# Patient Record
Sex: FEMALE | Race: OTHER | URBAN - METROPOLITAN AREA
[De-identification: names, ages, dates, MRNs, and addresses within clinical notes are randomized per-mention and may not be internally consistent; named-entity substitution may affect disease eponyms.]

---

## 2024-10-31 ENCOUNTER — LAB ENCOUNTER (OUTPATIENT)
Dept: LAB | Facility: HOSPITAL | Age: 29
End: 2024-10-31
Attending: NURSE PRACTITIONER
Payer: COMMERCIAL

## 2024-10-31 ENCOUNTER — TELEPHONE (OUTPATIENT)
Facility: CLINIC | Age: 29
End: 2024-10-31

## 2024-10-31 ENCOUNTER — OFFICE VISIT (OUTPATIENT)
Facility: CLINIC | Age: 29
End: 2024-10-31
Payer: COMMERCIAL

## 2024-10-31 VITALS
BODY MASS INDEX: 25.86 KG/M2 | HEIGHT: 61 IN | HEART RATE: 76 BPM | WEIGHT: 137 LBS | SYSTOLIC BLOOD PRESSURE: 130 MMHG | DIASTOLIC BLOOD PRESSURE: 85 MMHG

## 2024-10-31 DIAGNOSIS — K92.1 BLOOD IN STOOL: ICD-10-CM

## 2024-10-31 DIAGNOSIS — Z87.19 HISTORY OF COLITIS: ICD-10-CM

## 2024-10-31 DIAGNOSIS — R19.5 MUCUS IN STOOL: Primary | ICD-10-CM

## 2024-10-31 DIAGNOSIS — R19.5 MUCUS IN STOOL: ICD-10-CM

## 2024-10-31 DIAGNOSIS — Z87.19 HISTORY OF COLITIS: Primary | ICD-10-CM

## 2024-10-31 LAB
BASOPHILS # BLD AUTO: 0.08 X10(3) UL (ref 0–0.2)
BASOPHILS NFR BLD AUTO: 0.7 %
CRP SERPL-MCNC: <0.4 MG/DL (ref ?–1)
DEPRECATED RDW RBC AUTO: 43.3 FL (ref 35.1–46.3)
EOSINOPHIL # BLD AUTO: 0.22 X10(3) UL (ref 0–0.7)
EOSINOPHIL NFR BLD AUTO: 1.8 %
ERYTHROCYTE [DISTWIDTH] IN BLOOD BY AUTOMATED COUNT: 13.5 % (ref 11–15)
HCT VFR BLD AUTO: 40.2 %
HGB BLD-MCNC: 13.3 G/DL
IMM GRANULOCYTES # BLD AUTO: 0.09 X10(3) UL (ref 0–1)
IMM GRANULOCYTES NFR BLD: 0.8 %
LYMPHOCYTES # BLD AUTO: 2.93 X10(3) UL (ref 1–4)
LYMPHOCYTES NFR BLD AUTO: 24.4 %
MCH RBC QN AUTO: 29 PG (ref 26–34)
MCHC RBC AUTO-ENTMCNC: 33.1 G/DL (ref 31–37)
MCV RBC AUTO: 87.6 FL
MONOCYTES # BLD AUTO: 0.82 X10(3) UL (ref 0.1–1)
MONOCYTES NFR BLD AUTO: 6.8 %
NEUTROPHILS # BLD AUTO: 7.85 X10 (3) UL (ref 1.5–7.7)
NEUTROPHILS # BLD AUTO: 7.85 X10(3) UL (ref 1.5–7.7)
NEUTROPHILS NFR BLD AUTO: 65.5 %
PLATELET # BLD AUTO: 308 10(3)UL (ref 150–450)
RBC # BLD AUTO: 4.59 X10(6)UL
WBC # BLD AUTO: 12 X10(3) UL (ref 4–11)

## 2024-10-31 PROCEDURE — 36415 COLL VENOUS BLD VENIPUNCTURE: CPT

## 2024-10-31 PROCEDURE — 86140 C-REACTIVE PROTEIN: CPT

## 2024-10-31 PROCEDURE — 85025 COMPLETE CBC W/AUTO DIFF WBC: CPT

## 2024-10-31 RX ORDER — DIFLUPREDNATE OPHTHALMIC 0.5 MG/ML
EMULSION OPHTHALMIC
COMMUNITY
Start: 2024-10-25

## 2024-10-31 NOTE — PATIENT INSTRUCTIONS
-Schedule follow up with MD after colonoscopy   -lab today for inflammation and stool samples     1. Schedule colonoscopy with Urgent Pool Endoscopist  Diagnosis: history of ulcerative colitis, blood in stool   Sedation: MAC  Prep: split dose golytely    2.  bowel prep from pharmacy   You can pick the bowel prep up now and store in a cool, dry place in your home until your scheduled bowel prep start date.    3. Continue all medications as normal for your procedure. DO NOT TAKE: Any form of alcohol, recreational drugs and any forms of erectile dysfunction medications 24 hours prior to procedure.    4. Read all bowel prep instructions carefully. Bowel prep instructions can also be found online at:  www.[facility].org/giprep     5. AVOID seeds, nuts, popcorn, raw fruits and vegetables for 5 days before procedure    6. If you start any NEW medication after your visit today, please notify us. Certain medications (like iron or weight loss medications) will need to be held before the procedure, or the procedure cannot be performed safely.

## 2024-10-31 NOTE — TELEPHONE ENCOUNTER
Joshua Medrano patient has chosen to leave the hospital against medical advice. The attending physician has not discharged the patient. Patient is not a risk to himself or others. I have discussed with the patient the following:  Physician has not determined patient is ready for discharge, Risks and consequences of leaving the hospital too soon and Benefit of continued hospitalization.      Discharge against medical advice form has been Signed.      Attending physician has been notified.         Pt leaving AMA after discussion with ERP. Discharge instructions given to pt. Prescriptions handed to pt at bedside. Pt educated, verbalizes understanding. All belongings accounted for. Pt wheeled out of ED with family at side to go home. PIV removed and dressing applied.       Scheduled for:  Colonoscopy 27144   [facility]     Provider: Bernice Hurtado MD    Date:  12/13/2024 Friday  Time:   1:40 PM  (Patient made aware will receive phone call the day before with an arrival time)    Sedation:  MAC  Prep:  Split GoLytely    Diagnosis with codes:    ICD-10-CM   1. History of colitis  Z87.19   2. Blood in stool  K92.1        Meds/Allergies Reconciled?:   Provider Reviewed   Was patient informed to call insurance with codes (Y/N):  Yes  Referral sent?: Referral was sent at the time of electronic surgical scheduling.  [facility] or [facility] notified?: I sent an electronic request to ENDO Scheduling and received a confirmation today.     Medication Orders:  Patient is aware to NOT take iron pills, herbal meds and diet supplements for 7 days before exam. Also to NOT take any form of alcohol, recreational drugs and any forms of ED meds 24 hours before exam.       Misc Orders:  N/A   Further instructions given by staff:  I Provided prep instructions to patient and reviewed date, time and location. Patient verbalized that  She / Her understood and is aware to call with any questions.  Patient was informed about the new cancellation policy for She / Her procedure. Patient was also given copy of the cancellation policy at the time of the appointment and verbalized understanding.

## 2024-10-31 NOTE — H&P
[facility] - Gastroenterology                                                                                                  Clinic History and Physical     No chief complaint on file.      Requesting physician or provider: Unknown Pcp    HPI:   Cecily Rae is a 29 year old year-old female with history of ulcerative colitis. The patient presents for colon cancer screening evaluation.    Was diagnosed with ulcerative colitis in 2019 via flexsig per patient, only has paper given after procedure. Was sent Sulfasalazine rx but did ever start medication. Was unable to to follow up with GI and now the doctor has left the practice at [facility]. Is having a hard time getting records. Symptoms had been managed with diet until now.     Since July 2024 has had blood and mucus in stool. Having more constipation than diarrhea. Having a lot of cramping abdominal pain. Has rash on legs and feet on and off. Currently having inflammation in her eye being treated with steroids.   Has some sort of testing at PCP- [facility]- thinks was related to gluten.     Last colonoscopy: 2019 at [facility], patient unable to get records  Last EGD:     NSAIDS: none  Tobacco: none  Alcohol: socically  Marijuana: none  Illicit drugs: none    FH GI malignancy:  none  Aunt may have IBD    No history of adverse reaction to sedation  No MARILUZ  No anticoagulants/antiplatelet  No pacemaker/defibrillator  No pain medications and/or sleep aides    Wt Readings from Last 6 Encounters:   01/21/22 130 lb (59 kg)          History, Medications, Allergies, ROS:      Past Medical History:    Headache disorder      Past Surgical History:   Procedure Laterality Date    Colonoscopy  2019      Family Hx: No family history on file.   Social History:   Social History     Socioeconomic History    Marital status: OTHER   Tobacco Use    Smoking status: Never    Smokeless tobacco: Never   Vaping Use    Vaping status: Never Used   Substance and Sexual  Activity    Alcohol use: Yes    Drug use: Never    Sexual activity: Yes     Partners: Male        Medications (Active prior to today's visit):  No current outpatient medications on file.       Allergies:  Allergies[1]    ROS:   CONSTITUTIONAL: negative for fevers, chills, sweats  EYES Negative for scleral icterus or redness, and diplopia  HEENT: Negative for hoarseness  RESPIRATORY: Negative for cough and severe shortness of breath  CARDIOVASCULAR: Negative for crushing sub-sternal chest pain  GASTROINTESTINAL: See HPI  GENITOURINARY: Negative for dysuria  MUSCULOSKELETAL: Negative for arthralgias and myalgias  SKIN: Negative for jaundice, rash or pruritus  NEUROLOGICAL: Negative for dizziness and headaches  BEHAVIOR/PSYCH: Negative for psychotic behavior      PHYSICAL EXAM:   There were no vitals taken for this visit.    GEN: Alert, no acute distress, well-nourished   HEENT: anicteric sclera, neck supple, trachea midline, MMM, no palpable or tender neck or supraclavicular lymph nodes  CV: RRR, the extremities are warm and well perfused   LUNGS: No increased work of breathing, CTAB  ABDOMEN: Soft, symmetrical, non-tender without distention or guarding. No scars or lesions. Aorta is without bruit or visible pulsation. Umbilicus is midline without herniation. Normoactive bowel sounds are present, No masses, hepatomegaly or splenomegaly noted.  MSK: No erythema, no warmth, no swelling of joints  SKIN: No jaundice, no erythema, no rashes, no lesions  HEMATOLOGIC: No bleeding, no bruising  NEURO: Alert and interactive, ESTRADA  PSYCH: appropriate mood & affect    Labs/Imaging:     Patient's labs and imaging were reviewed and discussed with patient today.    .  ASSESSMENT/PLAN:   29 year old female presents for blood and mucous in stool x6 months.    #blood in stool  #mucus in stool  #history of colitis  Patient does not have pathology records. Advised to obtain stool testing, CRP, and CBC today. Schedule colonoscopy to  obtain accurate diagnosis. Follow up with MD after to discuss treatment options.       Endoscopy risk/benefit discussion: I have thoroughly discussed the risks, benefits, and alternatives of endoscopic evaluation with the patient, who demonstrated understanding. This includes the potential risks of bleeding, infection, pain, anesthesia complications, and perforation, which may result in prolonged hospitalization or surgical intervention. All of the patient’s questions were addressed to their satisfaction. The patient has chosen to proceed with the endoscopic procedure, including any necessary interventions such as polypectomy, biopsy, control of bleeding.      Orders This Visit:  No orders of the defined types were placed in this encounter.      Meds This Visit:  Requested Prescriptions      No prescriptions requested or ordered in this encounter       Imaging & Referrals:  None       SCHUYLER Rodriguez    [facility] Gastroenterology  10/31/2024               [1] No Known Allergies

## 2024-12-06 ENCOUNTER — TELEPHONE (OUTPATIENT)
Facility: CLINIC | Age: 29
End: 2024-12-06

## 2024-12-06 NOTE — TELEPHONE ENCOUNTER
1st Reminder letter was sent to patient mychart for orders pending:     C. diff toxigenic PCR (OPT) (Order #[id number]) on 10/31/24     Calprotectin, Fecal (Order #[id number]) on 10/31/24     Stool Culture W/Shigatoxin (Order #[id number]) on 10/31/24

## 2024-12-13 ENCOUNTER — HOSPITAL ENCOUNTER (OUTPATIENT)
Age: 29
Setting detail: HOSPITAL OUTPATIENT SURGERY
Discharge: HOME | End: 2024-12-13
Attending: INTERNAL MEDICINE | Admitting: INTERNAL MEDICINE
Payer: COMMERCIAL

## 2024-12-13 ENCOUNTER — ANESTHESIA (OUTPATIENT)
Dept: ENDOSCOPY | Age: 29
End: 2024-12-13
Payer: COMMERCIAL

## 2024-12-13 ENCOUNTER — ANESTHESIA EVENT (OUTPATIENT)
Dept: ENDOSCOPY | Age: 29
End: 2024-12-13
Payer: COMMERCIAL

## 2024-12-13 VITALS
HEIGHT: 61 IN | WEIGHT: 130 LBS | SYSTOLIC BLOOD PRESSURE: 104 MMHG | OXYGEN SATURATION: 99 % | BODY MASS INDEX: 24.55 KG/M2 | DIASTOLIC BLOOD PRESSURE: 69 MMHG | RESPIRATION RATE: 20 BRPM | HEART RATE: 70 BPM

## 2024-12-13 DIAGNOSIS — Z87.19 HISTORY OF COLITIS: ICD-10-CM

## 2024-12-13 DIAGNOSIS — K92.1 BLOOD IN STOOL: ICD-10-CM

## 2024-12-13 LAB — B-HCG UR QL: NEGATIVE

## 2024-12-13 PROCEDURE — 99070 SPECIAL SUPPLIES PHYS/QHP: CPT | Performed by: INTERNAL MEDICINE

## 2024-12-13 PROCEDURE — 45380 COLONOSCOPY AND BIOPSY: CPT | Performed by: INTERNAL MEDICINE

## 2024-12-13 RX ORDER — LIDOCAINE HYDROCHLORIDE 10 MG/ML
INJECTION, SOLUTION EPIDURAL; INFILTRATION; INTRACAUDAL; PERINEURAL AS NEEDED
Status: DISCONTINUED | OUTPATIENT
Start: 2024-12-13 | End: 2024-12-13 | Stop reason: SURG

## 2024-12-13 RX ORDER — MESALAMINE 4 G/60ML
4 SUSPENSION RECTAL NIGHTLY
Qty: 90 ENEMA | Refills: 1 | Status: SHIPPED | OUTPATIENT
Start: 2024-12-13 | End: 2025-12-08

## 2024-12-13 RX ORDER — SODIUM CHLORIDE, SODIUM LACTATE, POTASSIUM CHLORIDE, CALCIUM CHLORIDE 600; 310; 30; 20 MG/100ML; MG/100ML; MG/100ML; MG/100ML
INJECTION, SOLUTION INTRAVENOUS CONTINUOUS
OUTPATIENT
Start: 2024-12-13

## 2024-12-13 RX ORDER — SODIUM CHLORIDE, SODIUM LACTATE, POTASSIUM CHLORIDE, CALCIUM CHLORIDE 600; 310; 30; 20 MG/100ML; MG/100ML; MG/100ML; MG/100ML
INJECTION, SOLUTION INTRAVENOUS CONTINUOUS
Status: DISCONTINUED | OUTPATIENT
Start: 2024-12-13 | End: 2024-12-13

## 2024-12-13 RX ORDER — NALOXONE HYDROCHLORIDE 0.4 MG/ML
0.08 INJECTION, SOLUTION INTRAMUSCULAR; INTRAVENOUS; SUBCUTANEOUS ONCE AS NEEDED
OUTPATIENT
Start: 2024-12-13 | End: 2024-12-13

## 2024-12-13 RX ADMIN — LIDOCAINE HYDROCHLORIDE 50 MG: 10 INJECTION, SOLUTION EPIDURAL; INFILTRATION; INTRACAUDAL; PERINEURAL at 13:32:00

## 2024-12-13 RX ADMIN — SODIUM CHLORIDE, SODIUM LACTATE, POTASSIUM CHLORIDE, CALCIUM CHLORIDE: 600; 310; 30; 20 INJECTION, SOLUTION INTRAVENOUS at 13:45:00

## 2024-12-13 RX ADMIN — SODIUM CHLORIDE, SODIUM LACTATE, POTASSIUM CHLORIDE, CALCIUM CHLORIDE: 600; 310; 30; 20 INJECTION, SOLUTION INTRAVENOUS at 13:30:00

## 2024-12-13 NOTE — H&P
Pre Procedure History & Physical Examination    Patient Name: Cecily Rae  MRN: [id number]  CSN: [id number]  YOB: 1995    Diagnosis: rectal bleeding, tenesmus, history of colitis    Prescriptions Prior to Admission[1]  Current Facility-Administered Medications   Medication Dose Route Frequency    lactated ringers infusion   Intravenous Continuous       Allergies: Allergies[2]    Past Medical History:    Headache disorder     Past Surgical History:   Procedure Laterality Date    Colonoscopy  2019    Colonoscopy  12/13/2024     Family History   Problem Relation Age of Onset    Colon Cancer Neg      Social History     Tobacco Use    Smoking status: Never    Smokeless tobacco: Never   Substance Use Topics    Alcohol use: Yes         ROS:   CONSTITUTIONAL:  negative for fevers, rigors  EYES:  negative for diplopia   RESPIRATORY:  negative for severe shortness of breath  CARDIOVASCULAR:  negative for crushing sub-sternal chest pain  GASTROINTESTINAL:  see HPI  GENITOURINARY:  negative for dysuria or gross hematuria  INTEGUMENT/BREAST:  SKIN:  negative for jaundice   ALLERGIC/IMMUNOLOGIC:  negative for hay fever  ENDOCRINE:  negative for cold intolerance and heat intolerance  MUSCULOSKELETAL:  negative for joint effusion/severe erythema  BEHAVIOR/PSYCH:  negative for psychotic behavior      PHYSICAL EXAM:   /70   Pulse 70   Resp 15   Ht 5' 1\" (1.549 m)   Wt 130 lb (59 kg)   SpO2 99%   BMI 24.56 kg/m²       Gen: Patient appears comfortable and in no acute discomfort  HEENT: the sclera appears anicteric, oropharynx clear, mucus membranes appear moist  CV: regular rate and rhythm, the extremities are warm and well perfused   Lung: Moves air well; No labored breathing  Abdomen: soft, non-tender exam in all quadrants without rigidity or guarding, non-distended, no abnormal bowel sounds noted, no masses are palpated  Skin: No jaundice  Ext: no cyanosis, clubbing or edema is evident.   Neuro: Alert and  interactive, and gross movements of extremities normal    I have discussed the risks and benefits and alternatives of the procedure with the patient/family.  They understand and agree to proceed with plan of care.   I have reviewed recent H&P/clinic notes  Bernice Hurtado MD  [facility] - Gastroenterology  12/13/2024  1:25 PM         [1]   Medications Prior to Admission   Medication Sig Dispense Refill Last Dose/Taking    difluprednate 0.05 % Ophthalmic Emulsion INSTILL 1 DROP INTO RIGHT EYE FOUR TIMES DAILY FOR 5 DAYS   12/12/2024    polyethylene glycol, PEG 3350-KCl-NaBcb-NaCl-NaSulf, 236 g Oral Recon Soln Take 4,000 mL by mouth As Directed. Take 2,000 mL the night before your procedure and 2,000 mL the morning of your procedure. 1 each 0 12/12/2024   [2] No Known Allergies

## 2024-12-13 NOTE — ANESTHESIA POSTPROCEDURE EVALUATION
Patient: Cecily Rae    Procedure Summary       Date: 12/13/24 [facility] ENDO    Anesthesia Start: 1330 Anesthesia Stop: 1356    Procedure: COLONOSCOPY Diagnosis:       History of colitis      Blood in stool      (rectal sigmoid colitis, hemorrhoids)    Surgeons: Bernice Hurtado MD Anesthesiologist: Nirav Kwong DO    Anesthesia Type: MAC ASA Status: 1            Anesthesia Type: MAC    Vitals Value Taken Time   /72 12/13/24 1358   Temp  12/13/24 1400   Pulse 85 12/13/24 1359   Resp 13 12/13/24 1359   SpO2 97 % 12/13/24 1359   Vitals shown include unfiled device data.    [facility] AN Post Evaluation:   Patient Evaluated in PACU  Patient Participation: complete - patient participated  Level of Consciousness: awake  Pain Management: adequate  Airway Patency:patent  Dental exam unchanged from preop  Yes    Nausea/Vomiting: none  Cardiovascular Status: acceptable  Respiratory Status: acceptable and room air  Postoperative Hydration acceptable      NIRAV KWONG DO  12/13/2024 2:00 PM

## 2024-12-13 NOTE — DISCHARGE INSTRUCTIONS
Home Care Instructions for Colonoscopy with Sedation    Diet:  - Resume your regular diet as tolerated unless otherwise instructed.  - Start with light meals to minimize bloating.  - Do not drink alcohol today.    Medication:  - If you have questions about resuming your normal medications, please contact your Primary Care Physician.    Activities:  - Take it easy today. Do not return to work today.  - Do not drive today.  - Do not operate any machinery today (including kitchen equipment).    Colonoscopy:  - You may notice some rectal \"spotting\" (a little blood on the toilet tissue) for a day or two after the exam. This is normal.  - If you experience any rectal bleeding (not spotting), persistent tenderness or sharp severe abdominal pains, oral temperature over 100 degrees Fahrenheit, light-headedness or dizziness, or any other problems, contact your doctor.    **If unable to reach your doctor, please go to the [facility] Emergency Room**    - Your referring physician will receive a full report of your examination.  - If you do not hear from your doctor's office within two weeks of your biopsy, please call them for your results.    You may be able to see your laboratory results in [employer] between 4 and 7 business days.  In some cases, your physician may not have viewed the results before they are released to [employer].  If you have questions regarding your results contact the physician who ordered the test/exam by phone or via [employer] by choosing \"Ask a Medical Question.\"

## 2024-12-13 NOTE — PRE-SEDATION ASSESSMENT
Physician Pre-Sedation Assessment    Pre-Sedation Assessment:    Sedation History: Previous Sedation with No Complications and Airway Assessed    Cardiac: normal S1, S2  Respiratory: breath sounds clear bilaterally   Abdomen: soft, BS (+), non-tender    ASA Classification: 1. Normal healthy patient    Plan: MAC sedation

## 2024-12-13 NOTE — OPERATIVE REPORT
COLONOSCOPY REPORT    Cecily Rae     1995 Age 29 year old   PCP Unknown Pcp Endoscopist Bernice Hurtado MD     Date of procedure: 24    Procedure: Colonoscopy w/biopsies    Pre-operative diagnosis: rectal bleeding and tenesmus    Post-operative diagnosis: colitis from 15 cm to rectum    Medications: MAC sedation    Withdrawal time: 9 minutes    Procedure:  Informed consent was obtained from the patient after the risks of the procedure were discussed, including but not limited to bleeding, perforation, aspiration, infection, or possibility of a missed lesion. After discussions of the risks/benefits and alternatives to this procedure, as well as the planned sedation, the patient was placed in the left lateral decubitus position and begun on continuous blood pressure pulse oximetry and EKG monitoring and this was maintained throughout the procedure. Once an adequate level of sedation was obtained a digital rectal exam was completed. Then the lubricated tip of the [id number] diagnostic video colonoscope was inserted and advanced without difficulty to the cecum using the CO2 insufflation technique. The cecum was identified by localizing the trifold, the appendix and the ileocecal valve. Withdrawal was begun with thorough washing and careful examination of the colonic walls and folds. A routine second examination of the cecum/ascending colon was performed. Photodocumentation was obtained. The bowel prep was good. Views of the colon were excellent with washing. I then carefully withdrew the instrument from the patient who tolerated the procedure well.     Complications: none.    Findings:   1. No polyp(s) noted     2. Diverticulosis: none noted.    3. Terminal ileum: the visualized mucosa appeared normal.    4. Given colitis did not retroflex.    5. Erythema and oozing, biopsied for colitis from 15 cm to rectum. Otherwise the colonic mucosa throughout the colon showed normal vascular pattern, without  evidence of angioectasias or inflammation.     6. JOIE: normal rectal tone, no masses palpated.     Recommend:  Await pathology  Rowasa enema ordered  High fiber diet.  Monitor for blood in the stool. If having more than just tinge of blood, call office or go to the ER.    >>>If tissue was obtained and you have not received your pathology results either by phone or letter within 2 weeks, please call our office at [phone].    Specimens: rectosigmoid biospies

## 2024-12-13 NOTE — ANESTHESIA PREPROCEDURE EVALUATION
Anesthesia PreOp Note    HPI:     Cecily Rae is a 29 year old female who presents for preoperative consultation requested by: Bernice Hurtado MD    Date of Surgery: 12/13/2024    Procedure(s):  COLONOSCOPY  Indication: History of colitis / Blood in stool    Relevant Problems   No relevant active problems       NPO:  Last Liquid Consumption Date: 12/13/24  Last Liquid Consumption Time: 0900  Last Solid Consumption Date: 12/12/24  Last Solid Consumption Time: 0900  Last Liquid Consumption Date: 12/13/24          History Review:  There are no active problems to display for this patient.      Past Medical History:    Headache disorder       Past Surgical History:   Procedure Laterality Date    Colonoscopy  2019    Colonoscopy  12/13/2024       Prescriptions Prior to Admission[1]  Current Medications and Prescriptions Ordered in Epic[2]    Allergies[3]    Family History   Problem Relation Age of Onset    Colon Cancer Neg      Social History     Socioeconomic History    Marital status: OTHER   Tobacco Use    Smoking status: Never    Smokeless tobacco: Never   Vaping Use    Vaping status: Never Used   Substance and Sexual Activity    Alcohol use: Yes    Drug use: Never    Sexual activity: Yes     Partners: Male       Available pre-op labs reviewed.  Lab Results   Component Value Date    WBC 12.0 (H) 10/31/2024    RBC 4.59 10/31/2024    HGB 13.3 10/31/2024    HCT 40.2 10/31/2024    MCV 87.6 10/31/2024    MCH 29.0 10/31/2024    MCHC 33.1 10/31/2024    RDW 13.5 10/31/2024    .0 10/31/2024    URINEPREG Negative 12/13/2024             Vital Signs:  Body mass index is 24.56 kg/m².   height is 1.549 m (5' 1\") and weight is 59 kg (130 lb). Her blood pressure is 107/70 and her pulse is 70. Her respiration is 15 and oxygen saturation is 99%.   Vitals:    12/11/24 1619 12/13/24 1210   BP:  107/70   Pulse:  70   Resp:  15   SpO2:  99%   Weight: 59 kg (130 lb)    Height: 1.549 m (5' 1\")         Anesthesia Evaluation      Airway    Mallampati: I  TM distance: >3 FB  Neck ROM: full  Dental          Pulmonary - negative ROS and normal exam   Cardiovascular - negative ROS and normal exam    Neuro/Psych - negative ROS     GI/Hepatic/Renal - negative ROS     Endo/Other - negative ROS   Abdominal                  Anesthesia Plan:   ASA:  1  Plan:   MAC  Plan Comments: I have discussed the anesthetic plan, major risks and alternatives with the patient and answered all questions. The patient desires to proceed with surgery and anesthesia as planned.     Informed Consent Plan and Risks Discussed With:  Patient      I have informed Cecily Rae and/or legal guardian or family member of the nature of the anesthetic plan, benefits, risks including possible dental damage if relevant, major complications, and any alternative forms of anesthetic management.   All of the patient's questions were answered to the best of my ability. The patient desires the anesthetic management as planned.  TORI SMITH DO  12/13/2024 1:28 PM  Present on Admission:  **None**           [1]   Medications Prior to Admission   Medication Sig Dispense Refill Last Dose/Taking    difluprednate 0.05 % Ophthalmic Emulsion INSTILL 1 DROP INTO RIGHT EYE FOUR TIMES DAILY FOR 5 DAYS   12/12/2024    polyethylene glycol, PEG 3350-KCl-NaBcb-NaCl-NaSulf, 236 g Oral Recon Soln Take 4,000 mL by mouth As Directed. Take 2,000 mL the night before your procedure and 2,000 mL the morning of your procedure. 1 each 0 12/12/2024   [2]   Current Facility-Administered Medications Ordered in Epic   Medication Dose Route Frequency Provider Last Rate Last Admin    lactated ringers infusion   Intravenous Continuous Bernice Hurtado MD         No current [facility]-ordered outpatient medications on file.   [3] No Known Allergies

## 2024-12-24 ENCOUNTER — TELEPHONE (OUTPATIENT)
Facility: CLINIC | Age: 29
End: 2024-12-24

## 2024-12-24 NOTE — TELEPHONE ENCOUNTER
Pt scheduled for clinic appt with Dr. Hurtado on 1/22/25. No sooner overbook available.    Your Appointments      Wednesday January 22, 2025 8:30 AM  Follow Up Visit with Bernice Hurtado MD  [address] [address]  [phone]

## 2024-12-24 NOTE — TELEPHONE ENCOUNTER
----- Message from Bernice Hurtado sent at 12/18/2024  8:55 AM CST -----  Follow up in office with me in 2-3 weeks. Ok to double book

## 2025-01-22 ENCOUNTER — OFFICE VISIT (OUTPATIENT)
Dept: GASTROENTEROLOGY | Facility: CLINIC | Age: 30
End: 2025-01-22

## 2025-01-22 VITALS
BODY MASS INDEX: 25.68 KG/M2 | DIASTOLIC BLOOD PRESSURE: 70 MMHG | HEART RATE: 76 BPM | HEIGHT: 61 IN | SYSTOLIC BLOOD PRESSURE: 105 MMHG | WEIGHT: 136 LBS

## 2025-01-22 DIAGNOSIS — K51.219 ULCERATIVE PROCTITIS WITH COMPLICATION (HCC): Primary | ICD-10-CM

## 2025-01-22 PROCEDURE — 99214 OFFICE O/P EST MOD 30 MIN: CPT | Performed by: INTERNAL MEDICINE

## 2025-01-22 RX ORDER — MESALAMINE 1000 MG/1
1000 SUPPOSITORY RECTAL NIGHTLY
Qty: 90 SUPPOSITORY | Refills: 3 | Status: SHIPPED | OUTPATIENT
Start: 2025-01-22

## 2025-01-22 NOTE — PROGRESS NOTES
[facility] - Gastroenterology  Clinic Follow-up Visit    Chief Complaint   Patient presents with    Follow - Up         Subjective/HPI:   Cecily Rae is a 29 year old female, patient of Marisa GALLEGOS, with history of UC, who presents for follow up      Past Visit(s):  Saw Hemal Ibrara NP  Cecily Rae is a 29 year old year-old female with history of ulcerative colitis. The patient presents for colon cancer screening evaluation.     Was diagnosed with ulcerative colitis in 2019 via flexsig per patient, only has paper given after procedure. Was sent Sulfasalazine rx but did ever start medication. Was unable to to follow up with GI and now the doctor has left the practice at [facility]. Is having a hard time getting records. Symptoms had been managed with diet until now.      Since July 2024 has had blood and mucus in stool. Having more constipation than diarrhea. Having a lot of cramping abdominal pain. Has rash on legs and feet on and off. Currently having inflammation in her eye being treated with steroids.   Has some sort of testing at PCP- [facility]- thinks was related to gluten.       In office today 01/22/25, pt presents for f/u.  Did the enema and had some pain when inserting the medication. Pain would go away after 5 minutes. Did it for 1.5 weeks.   Symptoms completely resolved with the medication but given pain stopped doing the medication    Currently not as bad but still feels like it is coming back. No as severe right now. Some blood but not as frequent-- once every 3 days instead of daily. Tenesmus is starting to come back.     Overall, the patient feels otherwise well. Denies any GI symptoms of abdominal pain, nausea, vomiting, change in bowel habits, dyspepsia, dysphagia, hematemesis, hematochezia, or melena. Patient has a bowel movement each day. Additionally there is no change of appetite, unexpected weight loss, and no reported history of chest pain or shortness of  breath.      NSAIDS: none  Tobacco: none  Alcohol: socically  Marijuana: none  Illicit drugs: none     FH GI malignancy:  none  Aunt may have IBD     No history of adverse reaction to sedation  No MARILUZ  No anticoagulants/antiplatelet  No pacemaker/defibrillator  No pain medications and/or sleep aides    Prior endoscopies:  2019 at [facility]    Colonoscopy 12/13/2024  Findings:   1. No polyp(s) noted      2. Diverticulosis: none noted.     3. Terminal ileum: the visualized mucosa appeared normal.     4. Given colitis did not retroflex.     5. Erythema and oozing, biopsied for colitis from 15 cm to rectum. Otherwise the colonic mucosa throughout the colon showed normal vascular pattern, without evidence of angioectasias or inflammation.      6. JOIE: normal rectal tone, no masses palpated.      Recommend:  Await pathology  Rowasa enema ordered  High fiber diet.  Monitor for blood in the stool. If having more than just tinge of blood, call office or go to the ER.     >>>If tissue was obtained and you have not received your pathology results either by phone or letter within 2 weeks, please call our office at [phone].     Specimens: rectosigmoid biopsies  Final Diagnosis:      Rectosigmoid; biopsy:  Fragments of colonic/rectal mucosa with mild architectural distortion, foci of acute cryptitis and basal lymphoplasmacytosis, compatible with mild-moderate active colitis (see comment).  No evidence of dysplasia or carcinoma identified.     Comment:  Sections show fragments of colonic/rectal mucosa with mild architectural distortion and foci of acute cryptitis with basal lymphoplasmacytosis.    Patient has history of ulcerative colitis as per EMR.  The above findings are compatible with patient's clinical history.  Clinical and endoscopic correlation is recommended.       Social Hx:  - No smoking/etoh  - Denies illicit drug use   - NSAIDs/ASA use: PRN      History, Medications, Allergies, ROS:      Past Medical History:     Headache disorder      Past Surgical History:   Procedure Laterality Date    Colonoscopy  2019    Colonoscopy  12/13/2024    rectal sigmoid colitis, hemorrhoids    Colonoscopy N/A 12/13/2024    Procedure: COLONOSCOPY;  Surgeon: Bernice Hurtado MD;  Location: [facility]      Family History   Problem Relation Age of Onset    Colon Cancer Neg       Social History:   Social History     Socioeconomic History    Marital status: OTHER   Tobacco Use    Smoking status: Never    Smokeless tobacco: Never   Vaping Use    Vaping status: Never Used   Substance and Sexual Activity    Alcohol use: Yes    Drug use: Never    Sexual activity: Yes     Partners: Male        Medications (Active prior to today's visit):  Current Outpatient Medications   Medication Sig Dispense Refill    multivitamin Oral Chew Tab Chew 1 tablet by mouth daily.      Mesalamine 4 g Rectal Enema Place 1 enema (4 g total) rectally at bedtime. (Patient not taking: Reported on 1/22/2025) 90 enema 1    difluprednate 0.05 % Ophthalmic Emulsion INSTILL 1 DROP INTO RIGHT EYE FOUR TIMES DAILY FOR 5 DAYS (Patient not taking: Reported on 1/22/2025)         Allergies:  Allergies[1]    ROS:   CONSTITUTIONAL:  negative for fevers, chills  EYES:  negative for change in vision  RESPIRATORY:  negative for  shortness of breath  CARDIOVASCULAR:  negative for  chest pain  GASTROINTESTINAL:  see HPI  GENITOURINARY:  negative for dysuria and hematuria   SKIN:  negative for  rash  ALLERGIC/IMMUNOLOGIC:  negative for hay fever allergies  ENDOCRINE:  negative for cold intolerance and heat intolerance  MUSCULOSKELETAL:  negative for  joint stiffness and joint swelling  BEHAVIOR/PSYCH:  negative for depressed mood    PHYSICAL EXAM:   Blood pressure 105/70, pulse 76, height 5' 1\" (1.549 m), weight 136 lb (61.7 kg), last menstrual period 12/31/2024.    Gen: patient appears comfortable and in no acute distress  HEENT: conjunctiva pink, the sclera appears anicteric, oropharynx clear, mucus  membranes appear moist  CV: the extremities are warm and well perfused   Lung: effort normal and breath sounds normal, no respiratory distress, wheezes or rales  GI: soft, non-tender exam in all quadrants without rigidity or guarding, non-distended, no abnormal bowel sounds noted, no masses are palpated  : no CVA tenderness  Skin: dry, warm, no jaundice  Ext: no cyanosis, clubbing or edema is evident.   Neuro: Alert and oriented x4, and patient is having movements of all 4 extremities   Psych: Pt has a normal mood and affect, behavior is normal    Nursing note and vitals reviewed      Labs/Imaging:     Patient's labs and imaging were reviewed and discussed with patient today.  See HPI and A&P for further details.     .  ASSESSMENT/PLAN:   Cecily Rae is a 29 year old  female, patient of female, patient with history of UC, who presents for follow up      1. Rectosigmoid UC with inflammation and bleeding   Improving after 1.5 weeks of mesalamine enema but had some abdominal pain so stopped  Now symptoms slowly returning. Will change to suppository and if no improvement can consider oral tabs vs back to enema  Due for yearly labs  Also discussed regular eye exam and vaccination        Recommend:  Change to suppository nightly  Follow up in 3-6 months to reassess  Labs due with PCP    IBD Checklist:  PPD  Vaccinations:   -Hep A   -Hep B   -HPV   -Influenza   -Pneumococcal   -Td/Tdap   -MMR* live virus! to be given 6 weeks prior to biologics.     -Varicella*/Zoster* live virus! to be given prior to starting biologics.  Annual Exams:   -Pap smear   -Breast   -Blood pressure   -Ophtho   -Skin cancer  Radiology:   -DXA scan   -Mammogram  Colonoscopy:   -Postoperative (within 6-12 mo)   -Dysplasia surveillance    Labs:   -CBC   -LFTs   -Creatinine   -B12/folate/iron   -Vitamin D (25 [place] vitamin D)   -Lipids/glucose        Orders This Visit:  No orders of the defined types were placed in this encounter.      Meds This  Visit:  Requested Prescriptions      No prescriptions requested or ordered in this encounter       Imaging & Referrals:  None          [1] No Known Allergies

## (undated) DEVICE — KIT ENDO ORCAPOD 160/180/190

## (undated) DEVICE — V2 SPECIMEN COLLECTION MANIFOLD KIT: Brand: NEPTUNE

## (undated) DEVICE — STERILE LATEX POWDER-FREE SURGICAL GLOVESWITH NITRILE COATING: Brand: PROTEXIS

## (undated) DEVICE — MEDI-VAC NON-CONDUCTIVE SUCTION TUBING 6MM X 1.8M (6FT.) L: Brand: CARDINAL HEALTH

## (undated) DEVICE — KIT CLEAN ENDOKIT 1.1OZ GOWNX2

## (undated) DEVICE — GIJAW SINGLE-USE BIOPSY FORCEPS WITH NEEDLE: Brand: GIJAW

## (undated) DEVICE — 60 ML SYRINGE REGULAR TIP: Brand: MONOJECT

## (undated) DEVICE — CO2 CANNULA,SSOFT,ADLT,7O2,4CO2,FEMALE: Brand: MEDLINE

## (undated) NOTE — LETTER
[facility]  [address], [place], [place]    Authorization for Surgical Operation and Procedure                               I hereby authorize Bernice Hurtado MD, my physician and his/her assistants (if applicable), which may include medical students, residents, and/or fellows, to perform the following surgical operation/ procedure and administer such anesthesia as may be determined necessary by my physician: Operation/Procedure name (s) COLONOSCOPY on Cecily Rae   2.   I recognize that during the surgical operation/procedure, unforeseen conditions may necessitate additional or different procedures than those listed above.  I, therefore, further authorize and request that the above-named surgeon, assistants, or designees perform such procedures as are, in their judgment, necessary and desirable.    3.   My surgeon/physician has discussed prior to my surgery the potential benefits, risks and side effects of this procedure; the likelihood of achieving goals; and potential problems that might occur during recuperation.  They also discussed reasonable alternatives to the procedure, including risks, benefits, and side effects related to the alternatives and risks related to not receiving this procedure.  I have had all my questions answered and I acknowledge that no guarantee has been made as to the result that may be obtained.    4.   Should the need arise during my operation/procedure, which includes change of level of care prior to discharge, I also consent to the administration of blood and/or blood products.  Further, I understand that despite careful testing and screening of blood or blood products by collecting agencies, I may still be subject to ill effects as a result of receiving a blood transfusion and/or blood products.  The following are some, but not all, of the potential risks that can occur: fever and allergic reactions, hemolytic reactions, transmission of diseases such as  Hepatitis, AIDS and Cytomegalovirus (CMV) and fluid overload.  In the event that I wish to have an autologous transfusion of my own blood, or a directed donor transfusion, I will discuss this with my physician.  Check only if Refusing Blood or Blood Products  I understand refusal of blood or blood products as deemed necessary by my physician may have serious consequences to my condition to include possible death. I hereby assume responsibility for my refusal and release the hospital, its personnel, and my physicians from any responsibility for the consequences of my refusal.    o  Refuse   5.   I authorize the use of any specimen, organs, tissues, body parts or foreign objects that may be removed from my body during the operation/procedure for diagnosis, research or teaching purposes and their subsequent disposal by hospital authorities.  I also authorize the release of specimen test results and/or written reports to my treating physician on the hospital medical staff or other referring or consulting physicians involved in my care, at the discretion of the Pathologist or my treating physician.    6.   I consent to the photographing or videotaping of the operations or procedures to be performed, including appropriate portions of my body for medical, scientific, or educational purposes, provided my identity is not revealed by the pictures or by descriptive texts accompanying them.  If the procedure has been photographed/videotaped, the surgeon will obtain the original picture, image, videotape or CD.  The hospital will not be responsible for storage, release or maintenance of the picture, image, tape or CD.    7.   I consent to the presence of a  or observers in the operating room as deemed necessary by my physician or their designees.    8.   I recognize that in the event my procedure results in extended X-Ray/fluoroscopy time, I may develop a skin reaction.    9. If I have a Do Not Attempt  Resuscitation (DNAR) order in place, that status will be suspended while in the operating room, procedural suite, and during the recovery period unless otherwise explicitly stated by me (or a person authorized to consent on my behalf). The surgeon or my attending physician will determine when the applicable recovery period ends for purposes of reinstating the DNAR order.  10. Patients having a sterilization procedure: I understand that if the procedure is successful the results will be permanent and it will therefore be impossible for me to inseminate, conceive, or bear children.  I also understand that the procedure is intended to result in sterility, although the result has not been guaranteed.   11. I acknowledge that my physician has explained sedation/analgesia administration to me including the risk and benefits I consent to the administration of sedation/analgesia as may be necessary or desirable in the judgment of my physician.    I CERTIFY THAT I HAVE READ AND FULLY UNDERSTAND THE ABOVE CONSENT TO OPERATION and/or OTHER PROCEDURE.     ____________________________________  _________________________________        ______________________________  Signature of Patient    Signature of Responsible Person                Printed Name of Responsible Person                                      ____________________________________  _____________________________                ________________________________  Signature of Witness        Date  Time         Relationship to Patient    STATEMENT OF PHYSICIAN My signature below affirms that prior to the time of the procedure; I have explained to the patient and/or his/her legal representative, the risks and benefits involved in the proposed treatment and any reasonable alternative to the proposed treatment. I have also explained the risks and benefits involved in refusal of the proposed treatment and alternatives to the proposed treatment and have answered the patient's  questions. If I have a significant financial interest in a co-management agreement or a significant financial interest in any product or implant, or other significant relationship used in this procedure/surgery, I have disclosed this and had a discussion with my patient.     _____________________________________________________              _____________________________  (Signature of Physician)                                                                                         (Date)                                   (Time)  Patient Name: Cecily Rae      : 1995      Printed: 2024     Medical Record #: [id number]                                      Page 1 of 1

## (undated) NOTE — LETTER
[facility]  Administration of Anesthesia  Cecily LEIGH agree to be cared for by a physician anesthesiologist alone and/or with a nurse anesthetist, who is specially trained to monitor me and give me medicine to put me to sleep or keep me comfortable during my procedure    I understand that my anesthesiologist and/or anesthetist is not an employee or agent of [facility] or [employer]. He or she works for [facility]    As the patient asking for anesthesia services, I agree to:  Allow the anesthesiologist (anesthesia doctor) to give me medicine and do additional procedures as necessary. Some examples are: Starting or using an “IV” to give me medicine, fluids or blood during my procedure, and having a breathing tube placed to help me breathe when I’m asleep (intubation). In the event that my heart stops working properly, I understand that my anesthesiologist will make every effort to sustain my life, unless otherwise directed by [facility] Do Not Resuscitate documents.  Tell my anesthesia doctor before my procedure:  If I am pregnant.  The last time that I ate or drank.  iii. All of the medicines I take (including prescriptions, herbal supplements, and pills I can buy without a prescription (including street drugs/illegal medications). Failure to inform my anesthesiologist about these medicines may increase my risk of anesthetic complications.  iv.If I am allergic to anything or have had a reaction to anesthesia before.  I understand how the anesthesia medicine will help me (benefits).  I understand that with any type of anesthesia medicine there are risks:  The most common risks are: nausea, vomiting, sore throat, muscle soreness, damage to my eyes, mouth, or teeth (from breathing tube placement).  Rare risks include: remembering what happened during my procedure, allergic reactions to medications, injury to my airway, heart, lungs, vision, nerves, or muscles  and in extremely rare instances death.  My doctor has explained to me other choices available to me for my care (alternatives).  Pregnant Patients (“epidural”):  I understand that the risks of having an epidural (medicine given into my back to help control pain during labor), include itching, low blood pressure, difficulty urinating, headache or slowing of the baby’s heart. Very rare risks include infection, bleeding, seizure, irregular heart rhythms and nerve injury.  Regional Anesthesia (“spinal”, “epidural”, & “nerve blocks”):  I understand that rare but potential complications include headache, bleeding, infection, seizure, irregular heart rhythms, and nerve injury.    _____________________________________________________________________________  Patient (or Representative) Signature/Relationship to Patient  Date   Time    _____________________________________________________________________________   Name (if used)    Language/Organization   Time    _____________________________________________________________________________  Nurse Anesthetist Signature     Date   Time  _____________________________________________________________________________  Anesthesiologist Signature     Date   Time  I have discussed the procedure and information above with the patient (or patient’s representative) and answered their questions. The patient or their representative has agreed to have anesthesia services.    _____________________________________________________________________________  Witness        Date   Time  I have verified that the signature is that of the patient or patient’s representative, and that it was signed before the procedure  Patient Name: Cecily Rae     : 1995                 Printed: 2024 at 6:43 AM    Medical Record #: [id number]                                            Page 1 of 1  ----------ANESTHESIA CONSENT----------

## (undated) NOTE — LETTER
12/6/2024          Cecily Rae    [address]    [place] [address]         Dear Cecily,    Our records indicate that the tests ordered for you by SCHUYLER Rodriguez  have not been done.  If you have, in fact, already completed the tests or you do not wish to have the tests done, please contact our office at THE NUMBER LISTED BELOW.  Otherwise, please proceed with the testing.  Enclosed is a duplicate order for your convenience.      C. diff toxigenic PCR (OPT) (Order #[id number]) on 10/31/24     Calprotectin, Fecal (Order #[id number]) on 10/31/24     Stool Culture W/Shigatoxin (Order #[id number]) on 10/31/24         Sincerely,    SCHUYLER Rodriguez  [facility]  [address] [facility] [address]  [place] [address]  [phone]